# Patient Record
Sex: FEMALE | Race: AMERICAN INDIAN OR ALASKA NATIVE
[De-identification: names, ages, dates, MRNs, and addresses within clinical notes are randomized per-mention and may not be internally consistent; named-entity substitution may affect disease eponyms.]

---

## 2017-06-18 ENCOUNTER — HOSPITAL ENCOUNTER (EMERGENCY)
Dept: HOSPITAL 7 - FB.ED | Age: 27
Discharge: HOME | End: 2017-06-18
Payer: MEDICAID

## 2017-06-18 VITALS — SYSTOLIC BLOOD PRESSURE: 137 MMHG | DIASTOLIC BLOOD PRESSURE: 84 MMHG

## 2017-06-18 DIAGNOSIS — S93.401A: Primary | ICD-10-CM

## 2017-06-18 DIAGNOSIS — F17.210: ICD-10-CM
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
## 2017-06-18 PROCEDURE — 96372 THER/PROPH/DIAG INJ SC/IM: CPT

## 2017-06-18 PROCEDURE — 99283 EMERGENCY DEPT VISIT LOW MDM: CPT

## 2017-06-18 PROCEDURE — 73610 X-RAY EXAM OF ANKLE: CPT

## 2017-06-18 NOTE — EDM.PDOC
[id number] Complaint: RT FOOT PAIN


Time Seen by Provider: 06/18/17 19:34


Source of Information: Reports: Patient


History Limitations: Reports: No Limitations





- History of Present Illness


INITIAL COMMENTS - FREE TEXT/NARRATIVE: 





25 yo F who presented to the ER with right ankle pain following an ankle injury 

last night. Reports that a car door was accidentally slammed against her right 

ankle yesterday. She was initially able to walk home but has since developed 

increasing right ankle pain which she rates as 7/10 aggravated by movement. she 

has not taken any medications for pain. Presented to the ER on account of 

worsening of symptoms


Duration: Day(s): (occurred last night), Getting Worse


Location: Reports: Lower Extremity, Right


Quality: Reports: Sharp


Improves with: Reports: None


Worsens with: Reports: Movement


  ** Right Ankle


Pain Score (Numeric/FACES): 6





- Related Data


 Allergies











Allergy/AdvReac Type Severity Reaction Status Date / Time


 


No Known Allergies Allergy   Verified 06/18/17 19:27











Home Meds: 


 Home Meds





medroxyPROGESTERone Acetate [Depo-Provera] 1 ml IM Q90D 09/06/14 [History]











Social & Family History





- Tobacco Use


Smoking Status *Q: Current Every Day Smoker


Years of Tobacco use: 9





- Alcohol Use


Days Per Week of Alcohol Use: 1


Number of Drinks Per Day: 6


Total Drinks Per Week: 6





- Recreational Drug Use


Recreational Drug Use: No





Review of Systems





- Review of Systems


Review Of Systems: ROS reveals no pertinent complaints other than HPI.





ED EXAM, GENERAL





- Physical Exam


Exam: See Below


Exam Limited By: No Limitations


General Appearance: Alert, WD/WN, No Apparent Distress


Eye Exam: Bilateral Eye: EOMI


Ears: Normal External Exam, Normal Canal, Hearing Grossly Normal, Normal TMs


Nose: Normal Inspection, Normal Mucosa


Throat/Mouth: Normal Inspection, Normal Lips, Normal Teeth, Normal Oropharynx, 

Normal Voice


Head: Atraumatic, Normocephalic


Neck: Normal Inspection, Supple, Non-Tender, Full Range of Motion


Respiratory/Chest: No Respiratory Distress, Lungs Clear, Normal Breath Sounds, 

No Accessory Muscle Use


Cardiovascular: Normal Peripheral Pulses, Regular Rate, Rhythm, No Edema


GI/Abdominal: Normal Bowel Sounds, Non-Tender, No Organomegaly


Extremities: Other (Right ankle -- swollen, tender especially over the right 

Lateral malleolus)


Neurological: Alert, Oriented, CN II-XII Intact, Normal Cognition, Normal Gait


Psychiatric: Normal Affect, Normal Mood


Skin Exam: Warm, Dry, Normal Color


Lymphatic: No Adenopathy





Course





- Vital Signs


Last Recorded V/S: 


 Last Vital Signs











Temp  37.4 C   06/18/17 19:28


 


Pulse  113 H  06/18/17 19:28


 


Resp  14   06/18/17 19:28


 


BP  137/84   06/18/17 19:28


 


Pulse Ox  99   06/18/17 19:28














- Orders/Labs/Meds


Orders: 


 Active Orders 24 hr











 Category Date Time Status


 


 Ankle Min 3V Rt [CR] Stat Exams  06/18/17 19:32 Ordered











Meds: 


Medications














Discontinued Medications














Generic Name Dose Route Start Last Admin





  Trade Name Brentq  PRN Reason Stop Dose Admin


 


Ketorolac Tromethamine  60 mg  06/18/17 19:33  06/18/17 19:37





  Toradol  IM  06/18/17 19:34  60 mg





  ONETIME ONE   Administration














Departure





- Departure


Time of Disposition: 20:06


Disposition: Home, Self-Care 01


Condition: Good


Clinical Impression: 


 Ankle sprain








- Discharge Information


Instructions:  Ankle Sprain


Referrals: 


PCP,Unknown [Primary Care Provider] - 


Forms:  ED Department Discharge


Additional Instructions: 


Follow with PCP


Ibuprofen for pain


Return if symptoms worsen


Time off work -- 2 days


Call your Physician or Return to Emergency Department if:





   * Your condition worsens in any way.


   * You develop fever greater than 100.4.


   * You have vomitting that does not stop with medications.


   * You have pain that is not controlled with medications.





- My Orders


Last 24 Hours: 


My Active Orders





06/18/17 19:32


Ankle Min 3V Rt [CR] Stat 














- Assessment/Plan


Last 24 Hours: 


My Active Orders





06/18/17 19:32


Ankle Min 3V Rt [CR] Stat

## 2017-06-22 NOTE — CR
INDICATION:  Slammed ankle in car door yesterday. 



RIGHT ANKLE, 3 VIEWS: 



FINDINGS:  There is diffuse soft tissue swelling that surrounds the ankle, 
especially laterally.  



I do not see a fracture or dislocation as visualized.  There is a bone island 
in the posterior third of the calcaneus which is benign.  



IMPRESSION:  Soft tissue swelling.  No acute fracture.  If symptoms persist, 
consider follow-up radiographs or if necessary, MRI scan for further 
evaluation.  
LUCIA

## 2020-12-17 ENCOUNTER — HOSPITAL ENCOUNTER (EMERGENCY)
Dept: HOSPITAL 7 - FB.ED | Age: 30
Discharge: NURSING, REHAB OR LONG-TERM CARE | End: 2020-12-17
Payer: MEDICAID

## 2020-12-17 VITALS — DIASTOLIC BLOOD PRESSURE: 72 MMHG | HEART RATE: 107 BPM | SYSTOLIC BLOOD PRESSURE: 130 MMHG

## 2020-12-17 DIAGNOSIS — K52.9: ICD-10-CM

## 2020-12-17 DIAGNOSIS — Z20.828: ICD-10-CM

## 2020-12-17 DIAGNOSIS — F17.210: ICD-10-CM

## 2020-12-17 DIAGNOSIS — K57.20: Primary | ICD-10-CM

## 2020-12-17 PROCEDURE — 86140 C-REACTIVE PROTEIN: CPT

## 2020-12-17 PROCEDURE — 71045 X-RAY EXAM CHEST 1 VIEW: CPT

## 2020-12-17 PROCEDURE — 74177 CT ABD & PELVIS W/CONTRAST: CPT

## 2020-12-17 PROCEDURE — 99285 EMERGENCY DEPT VISIT HI MDM: CPT

## 2020-12-17 PROCEDURE — 87186 SC STD MICRODIL/AGAR DIL: CPT

## 2020-12-17 PROCEDURE — 85025 COMPLETE CBC W/AUTO DIFF WBC: CPT

## 2020-12-17 PROCEDURE — 87077 CULTURE AEROBIC IDENTIFY: CPT

## 2020-12-17 PROCEDURE — U0002 COVID-19 LAB TEST NON-CDC: HCPCS

## 2020-12-17 PROCEDURE — 83605 ASSAY OF LACTIC ACID: CPT

## 2020-12-17 PROCEDURE — 87040 BLOOD CULTURE FOR BACTERIA: CPT

## 2020-12-17 PROCEDURE — 85610 PROTHROMBIN TIME: CPT

## 2020-12-17 PROCEDURE — 87635 SARS-COV-2 COVID-19 AMP PRB: CPT

## 2020-12-17 PROCEDURE — 36415 COLL VENOUS BLD VENIPUNCTURE: CPT

## 2020-12-17 PROCEDURE — 96375 TX/PRO/DX INJ NEW DRUG ADDON: CPT

## 2020-12-17 PROCEDURE — 85730 THROMBOPLASTIN TIME PARTIAL: CPT

## 2020-12-17 PROCEDURE — 96365 THER/PROPH/DIAG IV INF INIT: CPT

## 2020-12-17 PROCEDURE — 80053 COMPREHEN METABOLIC PANEL: CPT

## 2020-12-17 RX ADMIN — IOPAMIDOL ONE ML: 755 INJECTION, SOLUTION INTRAVENOUS at 13:22

## 2020-12-17 NOTE — CT
INDICATION:   Abdominal pain/cramping/lower abdomen x 6 days. 



CT ABDOMEN AND PELVIS WITH CONTRAST:  Spiral 3.75 mm axial sections were 
obtained through the abdomen and pelvis with 98 cc Isovue 370 at 2 mL/s, with 
sagittal and coronal reconstructions, 100 second delay-12/17/2020-no 
comparisons. 



Total exam DLP was 2020.06 mGy-cm. 



The lower lung fields and pleural spaces visualized show no definite active 
infiltrate or effusion.  A linear density or two at the left lung base-lower 
lobe most likely is due to fibrotic change of minimal degree. 



The kidneys appeared normal without evidence of obstructive uropathy, 
pyelonephritis or mass lesions.   



The liver, gallbladder, common bile duct, adrenal glands, spleen, and pancreas 
appear normal.  



No masses are noted in the retroperitoneum.  A mild degree of retroperitoneal 
lymphadenopathy is noted, which is nonspecific.  



The appendix appeared normal visualized on axial images 74-82.  



No evidence of free air or bowel obstruction was identified.  



There is noted an abnormal loop of bowel with thickened wall and what appears to
be an abscess in the mid pelvis with the probable abscess measuring 
approximately 33 mm obliquely in the AP projection and approximately 23 mm 
transversely.  There is surrounding bowel loop fat stranding suggesting adjacent
peritonitis, which extends inferiorly to the level of the wall of the urinary 
bladder.  This process is in the anterior portion of the pelvis and appears to 
be involving the redundant sigmoid colon in that area.  This finding may be on 
the basis of diverticulitis, although colitis with subsequent abscess formation 
would be a consideration.  



No definite free air is seen in this area to suggest a definite perforation.  



No free fluid collections were identified-no other mass lesions or organomegaly 
was seen.  





IMPRESSION: 

1.  Findings are felt to be most compatible with diverticulitis or possibly 
colitis with abscess formation of the sigmoid colon located in the anterior 
upper mid pelvis, slightly to the right of midline.  

2.  Report was called to Dr. Jarvis at 1400 hours. 

[facility]

## 2020-12-17 NOTE — EDM.PDOC
ED HPI GENERAL MEDICAL PROBLEM





- General


Chief Complaint: Abdominal Pain


Stated Complaint: STOMACH SPASMS


Time Seen by Provider: 20 10:25


Source of Information: Reports: Patient


History Limitations: Reports: No Limitations





- History of Present Illness


INITIAL COMMENTS - FREE TEXT/NARRATIVE: 





Patient presented to the ED because of worsening abdominal pain. It started 2 

days ago with associated diarrhea which is watery. There is no associated 

fever,chills, nausea or urinary symptoms. She denies having any unusual vaginal 

discharge or previous history of STD. She has a history of SBO from a previous 

hernia repain several years ago.


  ** Lower Abdomen


Pain Score (Numeric/FACES): 4





- Related Data


                                    Allergies











Allergy/AdvReac Type Severity Reaction Status Date / Time


 


No Known Allergies Allergy   Verified 20 10:55











Home Meds: 


                                    Home Meds





NK [No Known Home Meds]  20 [History]











Past Medical History





- Past Health History


Medical/Surgical History: Denies Medical/Surgical History


OB/GYN History: Reports: Pregnancy





- Infectious Disease History


Infectious Disease History: Reports: Chicken Pox





- Past Surgical History


Female  Surgical History: Reports:  Section





Social & Family History





- Family History


Family Medical History: No Pertinent Family History





- Tobacco Use


Tobacco Use Status *Q: Light Tobacco User


Years of Tobacco use: 13


Packs/Tins Daily: 0





- Caffeine Use


Caffeine Use: Reports: Soda





- Recreational Drug Use


Recreational Drug Use: No





ED ROS GENERAL





- Review of Systems


Review Of Systems: See Below


Constitutional: Reports: No Symptoms


HEENT: Reports: No Symptoms


Respiratory: Reports: No Symptoms


Cardiovascular: Reports: No Symptoms


Endocrine: Reports: No Symptoms


GI/Abdominal: Reports: Abdominal Pain, Diarrhea


: Reports: No Symptoms


Musculoskeletal: Reports: No Symptoms


Skin: Reports: No Symptoms


Neurological: Reports: No Symptoms


Psychiatric: Reports: No Symptoms


Hematologic/Lymphatic: Reports: No Symptoms





ED EXAM, GI/ABD





- Physical Exam


Exam: See Below


Exam Limited By: No Limitations


General Appearance: Alert, No Apparent Distress


Ears: Normal External Exam, Normal Canal


Nose: Normal Inspection, Normal Mucosa, No Blood


Throat/Mouth: Normal Inspection, Normal Lips, Normal Teeth


Head: Atraumatic, Normocephalic


Neck: Normal Inspection, Supple, Non-Tender, Full Range of Motion


Respiratory/Chest: No Respiratory Distress, Lungs Clear, Normal Breath Sounds


Cardiovascular: Normal Peripheral Pulses, Regular Rate, Rhythm, No Edema, No 

Gallop


GI/Abdominal Exam: Normal Bowel Sounds, Soft, Tender (LLQ and suprapubic), Other

 (No peritonel signs)


Back Exam: Normal Inspection, Full Range of Motion


Extremities: Normal Inspection, Normal Range of Motion, Non-Tender


Neurological: Alert, Oriented, CN II-XII Intact, Normal Cognition





Course





- Vital Signs


Text/Narrative:: 





Labs/Abd-pelvis CT was discussed with patient


She want to be transferred to [facility] in [place]


Code Status: Full Code 


NS 1 L bolus


Morphine 2 mg IV


Zosyn 4.75 gm IV


Case discussed with Dr Fischer of [facility] ED


Last Recorded V/S: 





                                Last Vital Signs











Temp  36.6 C   20 10:17


 


Pulse  97   20 10:17


 


Resp  16   20 10:17


 


BP  142/79 H  20 10:17


 


Pulse Ox  99   20 10:17














- Orders/Labs/Meds


Orders: 





                               Active Orders 24 hr











 Category Date Time Status


 


 C-REACTIVE PROTEIN [CHEM] Stat Lab  20 11:10 Received


 


 COMPREHENSIVE METABOLIC PN,CMP [CHEM] Stat Lab  20 14:40 Results


 


 CORONAVIRUS COVID-19 GRACIELA [MOLEC] Stat Lab  20 14:17 Ordered


 


 CULTURE BLOOD [BC] Urgent Lab  20 14:35 Received


 


 CULTURE BLOOD [BC] Urgent Lab  20 14:40 Received


 


 INR,PT,PROTHROMBIN TIME [COAG] Stat Lab  20 11:10 Received


 


 LACTIC ACID [CHEM] Stat Lab  20 14:40 Received


 


 PTT,PARTIAL THROMBOPLSTIN TIME [COAG] Stat Lab  20 11:10 Received


 


 Sodium Chloride 0.9% [Saline Flush] Med  20 12:58 Active





 10 ml FLUSH ASDIRECTED PRN   


 


 Blood Culture x2 Reflex Set [OM.PC] Urgent Oth  20 14:16 Ordered


 


 Saline Lock Insert [OM.PC] Routine Oth  20 10:51 Ordered


 


 Saline Lock Insert [OM.PC] Routine Oth  20 12:58 Ordered








                                Medication Orders





Sodium Chloride (Saline Flush)  10 ml FLUSH ASDIRECTED PRN


   PRN Reason: Keep Vein Open








Labs: 





                                Laboratory Tests











  20 Range/Units





  11:00 11:10 14:40 


 


WBC   16.4 H   (3.0-10.3)  x10-3/uL


 


RBC   4.86   (3.60-5.20)  x10(6)uL


 


Hgb   14.4   (11.4-15.5)  g/dL


 


Hct   43.6   (34.2-48.2)  %


 


MCV   89.8   (76.7-100.5)  fL


 


MCH   29.6   (23.9-33.9)  pg


 


MCHC   33.0   (31.9-34.8)  g/dL


 


RDW   13.0   (12.3-16.5)  %


 


Plt Count   294   (151-488)  x10(3)uL


 


MPV   7.2   (7.1-12.4)  fL


 


Neut % (Auto)   81.5 H   (30.8-76.2)  %


 


Lymph % (Auto)   11.1 L   (18.4-52.1)  %


 


Mono % (Auto)   6.1   (4.4-15.7)  %


 


Eos % (Auto)   0.9   (0.6-8.1)  %


 


Baso % (Auto)   0.4   (0.2-1.5)  %


 


Neut # (Auto)   13.4 H   (1.5-6.3)  x10-3/uL


 


Lymph # (Auto)   1.8   (1.0-4.4)  x10-3/uL


 


Mono # (Auto)   1.0   (0.3-1.0)  x10-3/uL


 


Eos # (Auto)   0.2   (0.0-0.8)  x10-3/uL


 


Baso # (Auto)   0.1   (0.0-0.1)  x10-3/uL


 


Sodium  Cancelled   139  


 


Potassium  Cancelled   3.9  


 


Chloride  Cancelled   102  


 


Carbon Dioxide  Cancelled   25  


 


Anion Gap  Cancelled    


 


BUN  Cancelled   10  


 


Creatinine  Cancelled   0.9  


 


Est Cr Clr Drug Dosing  Cancelled   71.63  


 


Estimated GFR (MDRD)  Cancelled   > 60  


 


BUN/Creatinine Ratio  Cancelled   11.1  


 


Glucose  Cancelled   110  


 


Calcium  Cancelled   9.2  











Meds: 





Medications











Generic Name Dose Route Start Last Admin





  Trade Name Freq  PRN Reason Stop Dose Admin


 


Sodium Chloride  10 ml  20 12:58 





  Saline Flush  FLUSH  





  ASDIRECTED PRN  





  Keep Vein Open  














Discontinued Medications














Generic Name Dose Route Start Last Admin





  Trade Name Krystyna  PRN Reason Stop Dose Admin


 


Sodium Chloride  1,000 mls @ 999 mls/hr  20 11:00 





  Normal Saline  IV  





  ASDIRECTED ELIANE  


 


Piperacillin Sod/Tazobactam  100 mls @ 200 mls/hr  20 14:14 





  Sod 4.5 gm/ Sodium Chloride  IV  20 14:43 





  NOW STA  


 


Iopamidol  100 ml  20 13:14  20 13:22





  Isovue-370 (76%)  IV  20 13:15  98 ml





  ONETIME ONE   Administration


 


Morphine Sulfate  2 mg  20 14:24 





  Morphine  IVPUSH  20 14:25 





  ONETIME ONE  


 


Sodium Chloride  10 ml  20 10:51 





  Saline Flush  FLUSH  





  ASDIRECTED PRN  





  Keep Vein Open  














Departure





- Departure


Time of Disposition: 15:10


Disposition: DC/Tfer to Acute Hospital 02


Condition: Good


Clinical Impression: 


 Diverticulitis, Peritonitis, Gastroenteritis, Abscess of sigmoid colon








- Discharge Information


Referrals: 


Isatu Padron NP [Primary Care Provider] - 





Sepsis Event Note (ED)





- Evaluation


Sepsis Screening Result: No Definite Risk





- Focused Exam


Vital Signs: 





                                   Vital Signs











  Temp Pulse Resp BP Pulse Ox


 


 20 10:17  36.6 C  97  16  142/79 H  99














- My Orders


Last 24 Hours: 





My Active Orders





20 10:51


Saline Lock Insert [OM.PC] Routine 





20 11:10


C-REACTIVE PROTEIN [CHEM] Stat 


INR,PT,PROTHROMBIN TIME [COAG] Stat 


PTT,PARTIAL THROMBOPLSTIN TIME [COAG] Stat 





20 12:58


Sodium Chloride 0.9% [Saline Flush]   10 ml FLUSH ASDIRECTED PRN 


Saline Lock Insert [OM.PC] Routine 





20 14:16


Blood Culture x2 Reflex Set [OM.PC] Urgent 





20 14:17


CORONAVIRUS COVID-19 GRACIELA [MOLEC] Stat 





20 14:35


CULTURE BLOOD [BC] Urgent 





20 14:40


COMPREHENSIVE METABOLIC PN,CMP [CHEM] Stat 


CULTURE BLOOD [BC] Urgent 


LACTIC ACID [CHEM] Stat 














- Assessment/Plan


Last 24 Hours: 





My Active Orders





20 10:51


Saline Lock Insert [OM.PC] Routine 





20 11:10


C-REACTIVE PROTEIN [CHEM] Stat 


INR,PT,PROTHROMBIN TIME [COAG] Stat 


PTT,PARTIAL THROMBOPLSTIN TIME [COAG] Stat 





20 12:58


Sodium Chloride 0.9% [Saline Flush]   10 ml FLUSH ASDIRECTED PRN 


Saline Lock Insert [OM.PC] Routine 





20 14:16


Blood Culture x2 Reflex Set [OM.PC] Urgent 





20 14:17


CORONAVIRUS COVID-19 GRACIELA [MOLEC] Stat 





20 14:35


CULTURE BLOOD [BC] Urgent 





20 14:40


COMPREHENSIVE METABOLIC PN,CMP [CHEM] Stat 


CULTURE BLOOD [BC] Urgent 


LACTIC ACID [CHEM] Stat

## 2020-12-17 NOTE — CR
INDICATION:  Fever, cough.  



CHEST ONE VIEW:  Single frontal view of the chest was obtained 12/17/20 - no 
comparison.  



Heart and mediastinum are unremarkable.  



A mild-to-moderate dextroconvex scoliosis of the lower thoracic spine is noted. 




No consolidating pneumonia ore effusion was seen.  



IMPRESSION:  No acute process.  

MTDD

## 2022-09-05 ENCOUNTER — HOSPITAL ENCOUNTER (EMERGENCY)
Dept: HOSPITAL 7 - FB.ED | Age: 32
Discharge: NURSING, REHAB OR LONG-TERM CARE | End: 2022-09-05
Payer: MEDICAID

## 2022-09-05 VITALS — HEART RATE: 80 BPM

## 2022-09-05 VITALS — SYSTOLIC BLOOD PRESSURE: 138 MMHG | DIASTOLIC BLOOD PRESSURE: 72 MMHG

## 2022-09-05 DIAGNOSIS — E66.9: ICD-10-CM

## 2022-09-05 DIAGNOSIS — K57.20: Primary | ICD-10-CM

## 2022-09-05 DIAGNOSIS — K29.70: ICD-10-CM

## 2022-09-05 DIAGNOSIS — Z20.822: ICD-10-CM

## 2022-09-05 PROCEDURE — 96365 THER/PROPH/DIAG IV INF INIT: CPT

## 2022-09-05 PROCEDURE — 80053 COMPREHEN METABOLIC PANEL: CPT

## 2022-09-05 PROCEDURE — 36415 COLL VENOUS BLD VENIPUNCTURE: CPT

## 2022-09-05 PROCEDURE — 96361 HYDRATE IV INFUSION ADD-ON: CPT

## 2022-09-05 PROCEDURE — 87635 SARS-COV-2 COVID-19 AMP PRB: CPT

## 2022-09-05 PROCEDURE — 99285 EMERGENCY DEPT VISIT HI MDM: CPT

## 2022-09-05 PROCEDURE — 85025 COMPLETE CBC W/AUTO DIFF WBC: CPT

## 2022-09-05 PROCEDURE — 86140 C-REACTIVE PROTEIN: CPT

## 2022-09-05 PROCEDURE — U0002 COVID-19 LAB TEST NON-CDC: HCPCS

## 2022-09-28 ENCOUNTER — HOSPITAL ENCOUNTER (EMERGENCY)
Dept: HOSPITAL 7 - FB.ED | Age: 32
Discharge: HOME | End: 2022-09-28
Payer: MEDICAID

## 2022-09-28 VITALS — HEART RATE: 90 BPM | SYSTOLIC BLOOD PRESSURE: 127 MMHG | DIASTOLIC BLOOD PRESSURE: 86 MMHG

## 2022-09-28 DIAGNOSIS — N97.1: ICD-10-CM

## 2022-09-28 DIAGNOSIS — E66.9: ICD-10-CM

## 2022-09-28 DIAGNOSIS — F17.210: ICD-10-CM

## 2022-09-28 DIAGNOSIS — K65.1: Primary | ICD-10-CM
